# Patient Record
Sex: MALE | Race: WHITE
[De-identification: names, ages, dates, MRNs, and addresses within clinical notes are randomized per-mention and may not be internally consistent; named-entity substitution may affect disease eponyms.]

---

## 2017-01-27 ENCOUNTER — HOSPITAL ENCOUNTER (OUTPATIENT)
Dept: HOSPITAL 45 - C.ULTR | Age: 52
Discharge: HOME | End: 2017-01-27
Attending: INTERNAL MEDICINE
Payer: COMMERCIAL

## 2017-01-27 DIAGNOSIS — M79.1: ICD-10-CM

## 2017-01-27 DIAGNOSIS — E04.1: Primary | ICD-10-CM

## 2017-01-27 LAB — TSH SERPL-ACNC: 1.48 UIU/ML (ref 0.3–4.5)

## 2017-01-27 NOTE — DIAGNOSTIC IMAGING REPORT
ULTRASOUND OF THE THYROID GLAND



CLINICAL HISTORY: Thyroid nodule.



COMPARISON STUDY: Chest CT dated 7/30/2012.



TECHNIQUE: Real-time, grayscale, and color flow sonography of the thyroid gland

is performed utilizing a high-frequency linear transducer. Images are reviewed

in the transverse and longitudinal planes.



FINDINGS:



Right lobe: The right lobe of the thyroid gland is normal in size and

homogeneous in echotexture, measuring 5.0 x 1.9 x 1.8 cm.



Left lobe: The left lobe of the thyroid gland is normal in size and homogeneous

in echotexture, measuring 4.5 x 1.8 x 2.0 cm.



Isthmus: The thyroid isthmus is normal in appearance and measures 0.4 cm in AP

diameter.





IMPRESSION: Unremarkable sonographic assessment of the thyroid gland. No thyroid

nodule is identified.







Electronically signed by:  Martinez Avila M.D.

1/27/2017 11:11 AM



Dictated Date/Time:  1/27/2017 11:09 AM

## 2017-02-21 ENCOUNTER — HOSPITAL ENCOUNTER (OUTPATIENT)
Dept: HOSPITAL 45 - C.NEUR | Age: 52
Discharge: HOME | End: 2017-02-21
Payer: COMMERCIAL

## 2017-02-21 DIAGNOSIS — G47.33: Primary | ICD-10-CM

## 2017-02-22 NOTE — PAP/PSG TECHNICIAN REPORT
[facility]

Technician Polysomnogram Report



Study name:

None

Report date:

2/22/2017



Study date:

2/21/2017

Referring Physician:

 Miles Gonzalez M.D.



Name:

BRYAN FIELD

Interpreting Physician:

ELVIRA Gonzalez M.D.



YOB: 1965

Technician:

KAMERON Costa.



Sex:

Male







Age:

51

StudyType:

PSG



Weight:

272 lbs









Height:

51 years, Height 6' 0"

:







BMI:

36.89







Medications:

Prilosec 20mg, Mirapex, Lyrica 150mg, Spiriva 18mcg inhalation caps, Ventolin HFA 108mcg/act, 
Nicotine gum, Ativan 0.5mg, Lantus 100 unit/ml, Ranitidine HCl 300mg, Ursodiol 300mg

















Patient History





Study started on room air with ETCO2 monitoring in room #6. 51 yr old male here tonight for a 
possible split psg if AHI>10. He has multiple medical problems (liver disease, HTN, diabetes and 
lung problems). He was diagnosed with LAZARO and nocturnal hypoxemia years ago, and has been on cpap 
and oxygen. He is intolerant to cpap and has difficulty with wearing the mask. He is currently not 
using cpap or oxygen. He is having EDS and problems falling asleep.









Parameters Monitored

NPSG: E1-M2, E2-M1, Fp1-M2, Fp2-M1, F3-M2, F4-M2, F4-M1, C3-M2, C4-M2, C4-M1, O1-M2, O2-M2,

O2-M1, T3-M2, T4-M1, P3-M2, P4-M1, CHIN1, CHIN2, HR, EKG, Legs, PFLOW, SNOR, FLOW, CFLOW,

Tidal Volume, THOR, ABDO, SpO2, PLTH, CPRESS, ETCO2 Wave, ETCO2, pH





Sleep Architecture



Sleep Stages



Time at Lights Off

9:50:43 PM



STAGES

Time (min.)

TST (%)



Time at Lights On

12:31:43 AM



Wake

155.5

--



Total Recording Time (TRT)

161.00 min.



N1

5.5

100



Total Sleep Period (TSP)

6.0 min.



N2

0.0

0



Total Sleep Time (TST)

5.5min.



N3

0.0

0



Awake Time

155.5 min.



REM

0.0

0



Wake after Sleep Onset

75.5 min.











Sleep Efficiency (SE)

3 %











Sleep Onset Latency (SOL)

80.0 min.











Number of Stage 1 Shifts

None











Awakenings

2











Stage Changes

4











Number of REM periods

N/A



REM

0.0

0



REM Latency

NONE min.



NREM

5.5

100





Body Position Analysis





Supine

Right

Left

Side

Prone

Vertical



Total Sleep Time (min.)

14.2

5.5

0.0

5.50

0.0

0.0



Total Sleep Time (%)

0%

100%

0%

100

0%

N/A%



Total Sleep Time REM (min.)

0.0

None

0.0

0.0



Total Sleep Time NREM (min.)

0.0

5.5

0.0

None

0.0

0.0



Intermittent Wake (min.)

14.2

87.4

53.9

None

0.0

0.0



Total Sleep Period (%)

0%

None





Arousals







Myoclonus (PLM) *







Events

Count

Index



Events

Count

Index



Spontaneous

2

22



Events Awake (PLMW)

161

62.1



Respiratory

0

0.0



Events Asleep w/ Arousal (PLMA)

9

98.2



PLM

9

98



Events Asleep w/o Arousal (PLMS)

3

32.7



Snoring

0

0



Total Asleep

12

130.9



Total

11

120



Total

173

64







Respiratory Analysis *





CA

OA

MA

CH

H

RERA

Total



Count

0



Index

0.0

0

0.0

0

0.0



Mean Duration

0.0

0.00

0.0



Longest Duration

0.0

0.00

0.0







Respiratory Event Summary 







Total

Supine

~Supine

Right

Left

Prone

REM

NREM



Apneas

Count

0

N/A

0

0

N/A

0





Index

0.0

N/A

0

0.0

N/A

0



Hypopneas (4% Desat)

Count

0

N/A

0

0

N/A

0





Index

0.0

N/A

0

0.0

N/A

0.0



Apneas & All Hypopneas 

Count

0

N/A

0

0

N/A

0





Index

0.0

N/A

0

0

N/A

0.0



Respiratory Events 

(Apn+All Hyp+RERA)

Count

0

N/A

0

0

N/A

0





Index

0.0

N/A

0

0.0

N/A

0.0



Respiratory Related Arousal

Count

0

N/A

0

0

N/A

0





Index

0.0

N/A

0

0

N/A

0





Snoring Analysis





Supine

Right

Left

Prone

REM

NREM

Total



Snore duration

0.6 min



Snores count

N/A

13

N/A

13

13



Snore mean duration

2.7 Sec



Snores index

N/A

142

N/A

141.8

141.8



TST with snoring (%)

10.7%









SpO2 Analysis



Total

REM

NREM

Awake



<50%

0.0 min.



51 - 60%

0.0 min.



61 - 70%

0.0 min.



71 - 80%

0.0 min.



81 - 90%

0.4 min.

0.0 min.

0.0 min.

0.4 min.



91 - 100%

134.8 min.

0.0 min.

5.5 min.

129.3 min.



Average

95

0

96

95



Minimum SpO2

82

N/A

94

82



Desaturation Event Index

5.6

0.0

0.0

5.8



# Desat. Events below 89%

N/A



Time(%) with Saturation below 89%

0.1

0.0

0.0

0.1



Time(min.) with Saturation below 89%

0.2

0.0

0.0

0.2







Heart Rate Analysis



End Tidal CO2 Analysis





Min (bpm)

Max (bpm)

Average (bpm)





TSP (mins)

% of TSP



Awake

64

214

80



Above 55 mmHg

0.0

0.0



NREM

71

92

80



50-55 mmHg

0.0

0.0



REM

N/A



45-50 mmHg

0.0

0.0



Overall

71

92

80



40-45 mmHg

0.0

0.0













35-40 mmHg

0.9

16.6













30-35 mmHg

4.5

81.0































Average ETCO2

0.0





Supplemental O2 Values



Minimum O2 level: None



Value  

Start Time

End Time





Technician Comments















 



 



 



 



 



 







Mr. Field laid in the right, left and supine positions.  No cardiac arrhythmia noted. His legs 
seemed to move the entire time he was in bed.   No bruxism noted.  Snoring was not noted and scored 
as a 0 on a scale of 1 through 5. (0=no snoring, 5=snoring loud enough to be heard through a closed 
door or down the [address] way) He used the restroom once during the night.  His total sleep time was 
only 5and1/2 minutes. He became frustrated and ended the study early and left the sleep lab at 
12:25am. 

The final report will be interpreted and signed by a sleep physician. The completed physician report 
will then be placed in the patient medical record 



Therapy (cm H2O)

0



TIB (min.)

161.0



TST (min.)

5.5



Sleep Onset (min.)

80.0



REM Onset From Sleep (min.)

NONE



Sleep Efficiency %

3



Wakefulness (%)

97



Wakefulness (min.)

155.5



NREM 1 (%)

100



NREM 1 (min.)

5.5



NREM 2 (%)

0



NREM 2 (min.)

0.0



NREM 3 (%)

0



NREM 3 (min.)

0.0







REM (%)

0



REM (min.)

0.0



# Arousals

11



Arousal Index

120



# Snore

13



Snore Index

141.8



AHI

0.0



AHI Supine

N/A



AHI Non-Supine

0



NREM AHI

0.0



REM AHI

N/A



RDI

0.0



# Obstructive Apnea

0



# Central Apnea

0



# Mixed Apnea

0







# Hypopneas

0



RERAs

0



Total Respiratory Events

0



Time Below SpO2 89% (min.)

0.0



Mean NREM SpO2 (%)

96



Mean REM SpO2 (%)

N/A



Mean Sleep SpO2 (%)

96



Min NREM SpO2 (%)

94



Min REM SpO2 (%)

N/A



Position Supine (min.)

14.2



Position Non-supine (min.)

5.5



LM Index Sleep

130.9



LM Index NREM

130.9



LM Index REM

N/A







Mean Heart Rate (bpm)

80



Min Heart Rate (bpm)

71

## 2017-02-27 NOTE — POLYSOMNOGRAPH REPORT
REFERRING PERSON:  Dr. Meg Gonzalez.

 

TECHNICIAN:  Cathleen Cortez.

 

Mr. Field is a 51-year-old male with multiple medical problems including

liver disease, hypertension, diabetes who was diagnosed with obstructive

sleep apnea with nocturnal hypoxemia, years ago.  He came intolerant to CPAP

and was discontinued.  He is having excessive daytime sleepiness.  He returns

to the sleep lab to see if he can qualify for equipment, once again.

 

Following the technical and digital specifications of the American Academy of

Sleep Medicine (AASM) a standard diagnostic polysomnogram was performed

monitoring EEG, EOG, EMG (chin and leg deviations), oxygen saturation, body

position, digital video, respiratory effort and airflow.  The sleep Stage and

event scoring was based on the AASM Manual for the Scoring of Sleep and

Associated Events 2007 edition.  Apneas are defined as a drop in the peak

thermal sensor excursion by >90% of baseline for at least 10 seconds. 

Hypopneas were scored using the 4% oxygen desaturation rule (4A-[employer]) and

a decrease in the nasal pressure excursions by >30% of baseline for at least

10 seconds.  Respiratory effort-related arousal (RERA's) is defined as a

sequence of breaths lasting at least 10 seconds characterized by increasing

respiratory effort or flattening of the nasal pressure waveform leading to an

arousal from sleep when the sequence of breaths does not meet criteria for an

apnea or hypopnea.  Apnea Hypopnea index (AHI) is defined as the number of

apneas and hypopneas occurring in an hour of sleep.  Respiratory disturbance

index (RDI) is defined as the number of apneas, hypopneas, and RERA's

occurring in an hour of sleep.

 

 

Mr. Field struggled on this polysomnogram.  He was observed from 9:50

p.m. to 12:31 a.m.  Total recording time was 161 minutes.  Total sleep time

was only 5.5 minutes.  Only 5.5 minutes of N1 sleep was recorded on this

study.  There were no respiratory events during that time.  There were 12

periodic limb movements during that time.  Thirteen snoring events were

recorded.  Mean saturation was 95%.  This patient became frustrated and

wanted to discontinue therapy and this patient requested this study end at

2:25 a.m.  He left the sleep lab shortly thereafter.

 

IMPRESSION AND PLAN:  Nondiagnostic polysomnogram.  Only 5.5 minutes of total

sleep time was recorded during 161 minutes of observation.  This test will

need to be repeated.

## 2017-03-01 ENCOUNTER — HOSPITAL ENCOUNTER (OUTPATIENT)
Dept: HOSPITAL 45 - C.GI | Age: 52
Discharge: HOME | End: 2017-03-01
Attending: INTERNAL MEDICINE
Payer: COMMERCIAL

## 2017-03-01 VITALS — SYSTOLIC BLOOD PRESSURE: 131 MMHG | HEART RATE: 77 BPM | OXYGEN SATURATION: 97 % | DIASTOLIC BLOOD PRESSURE: 79 MMHG

## 2017-03-01 VITALS
HEIGHT: 72.99 IN | WEIGHT: 275.58 LBS | BODY MASS INDEX: 36.52 KG/M2 | WEIGHT: 275.58 LBS | BODY MASS INDEX: 36.52 KG/M2 | HEIGHT: 72.99 IN

## 2017-03-01 VITALS — TEMPERATURE: 98.6 F

## 2017-03-01 DIAGNOSIS — Z88.5: ICD-10-CM

## 2017-03-01 DIAGNOSIS — J44.9: ICD-10-CM

## 2017-03-01 DIAGNOSIS — E11.9: ICD-10-CM

## 2017-03-01 DIAGNOSIS — R93.3: Primary | ICD-10-CM

## 2017-03-01 DIAGNOSIS — K57.30: ICD-10-CM

## 2017-03-01 DIAGNOSIS — Z88.0: ICD-10-CM

## 2017-03-01 DIAGNOSIS — K64.8: ICD-10-CM

## 2017-03-01 NOTE — ANESTHESIOLOGY PROGRESS NOTE
Anesthesia Post Op Note


Date & Time


Mar 1, 2017 at 15:31





Vital Signs


Pain Intensity:  0





 Vital Signs Past 12 Hours








  Date Time  Temp Pulse Resp B/P Pulse Ox O2 Delivery O2 Flow Rate FiO2


 


3/1/17 11:46  77 20 131/79 97 Room Air  


 


3/1/17 11:31  84 20 114/70 94 Room Air  


 


3/1/17 11:16  87 20 108/63 94 Room Air  


 


3/1/17 10:07 37.0 76 20 124/74 98 Room Air  











Notes


Mental Status:  alert / awake / arousable


Nausea / Vomiting:  adequately controlled


Pain:  adequately controlled


Airway Patency, RR, SpO2:  stable & adequate


BP & HR:  stable & adequate


Hydration State:  stable & adequate


Anesthetic Complications:  no major complications apparent

## 2017-03-01 NOTE — DISCHARGE INSTRUCTIONS
Endoscopy Patient Instructions


Date / Procedure(s) Performed


Mar 1, 2017.


Colonoscopy





Allergy Information


Coded Allergies:  


     Penicillins (Verified  Allergy, Severe, ANAPHYLAXIS, 3/1/17)


 reports an iv dose of penicillin in ER when he was 22 yrs


 old.  Had to stay in hospital for 2 days.


     Morphine (Verified  Allergy, Intermediate, Severe itching/mouth rash, 3/1/

17)


 * PT MUST BE PRETREATED WITH BENADRYL


     Adhesives (Verified  Allergy, Unknown, rips skin, 3/1/17)


     Meperidine (Verified  Allergy, Unknown, pruritis/rash, 3/1/17)


 is able to tolerate IF PRETREATED WITH BENADRYL





Discharge Date / Findings


Mar 1, 2017.


Hemorrhoids


Diverticulosis of the colon





Medication Instructions


Stopped Medication(s):  


INSULIN  STOP MORNING DOSE


Restart Stopped Medication(s):





 Reported Home Medications








 Medications  Dose


 Route/Sig


 Max Daily Dose Days Date Category


 


 Novolog Flexpen


  (Insulin Aspart)


 100 Units/Ml Inj  15


 QD


    3/1/17 Reported


 


 Lantus (Insulin


 Glargine) 100


 Unit/Ml Inj  0


 SC SUPPER


    3/1/17 Reported


 


 Lantus (Insulin


 Glargine) 100


 Unit/Ml Inj  0


 SC BEFORE LUNCH


    3/1/17 Reported


 


 Lantus (Insulin


 Glargine) 100


 Unit/Ml Inj  0


 SC AM PRN


    3/1/17 Reported


 


 Ondansetron HCl


  (Ondansetron) 8


 Mg Tab  8 Mg


 PO Q8 PRN


    12/24/16 Reported


 


 Zantac


  (Ranitidine HCl)


 150 Mg Tab  300 Mg


 PO QPM


    9/30/16 Reported


 


 Prilosec


  (Omeprazole) 20


 Mg Cap  20 Mg


 PO QPM PRN


    9/30/16 Reported


 


 Ventolin


  (Albuterol)  Inh  2 Puffs


 INH QID PRN


   5 3/31/16 Reported


 


 Prilosec


  (Omeprazole) 20


 Mg Cap  40 Mg


 PO QPM


    10/16/15 Reported











Provider Instructions





Activity Restrictions





-  No exercising or heavy lifting for 24 hours. 


-  Do not drink alcohol the day of the procedure.


-  Do not drive a car or operate machinery until the day after the procedure.


-  Do not make any important decisions or sign important papers in 24 hours 

after the procedure.





Following Day:





-  Return to full activity which may include returning to work/school.





Diet





Start your diet with liquids and light foods (jello, soup, juice, toast).  Then 

eat your usual diet if not nauseated.





Treatment For Common After Affects





For mild abdominal pain, bloating, or excessive gas:





-  Rest


-  Eat lightly


-  Lie on right side





Follow-Up Information


Repeat colonoscopy in 3 years due to a suboptimal bowel preparation





Anesthesia Information





What You Should Know





You have had a procedure that required some medicine to reduce anxiety and 

discomfort. This treatment is called moderate sedation.  


After receiving the treatment, you may be sleepy, but you will be able to 

breathe on your own.  The effects of the treatment may last for several hours.








Follow these instructions along with Activity/Diet recommendations noted above:





*  Do NOT do anything where dizziness or clumsiness would be dangerous.





*  Rest quietly at home today, then you can be up and about tomorrow.





*  Have a responsible person stay with you the rest of today.





*  You may have had an I.V. today.  If so, you may take the dressing off later 

today.





Recommendations


 


Call your doctor if:





*  Trouble breathing 





*  Continuous vomiting for more than 24 hours





*  Temperature above 101 degrees





*  Severe abdominal pain or bloating





*  Pain not relieved by pain medicine ordered





*  There is increased drainage or redness from any incision





*  A large amount of rectal bleeding greater than 2-3 tablespoons. 


   (If you had a polyp/s removed or have hemorrhoids, a small amount of blood -


    from the rectum is to be expected.)





*  You have any unanswered questions or concerns.





IN THE EVENT OF A SERIOUS EMERGENCY, GO TO THE NEAREST EMERGENCY ROOM





       Your discharge instructions were prepared by provider Wiley Robb.


 Patient Instructions Signature Page








Blake Field 











Patient (or Guardian) Signature/Date:____________________________________ I 

have read and understand the instructions given to me by my caregivers.








Caregiver/RN/Doctor Signature/Date:____________________________________








The above-named patient and/or guardian has received patient instructions on 

this date.


























+  Original Patient Signature Page (only) stays with chart.  Please make copy 

for patient.

## 2017-03-01 NOTE — ENDO HISTORY AND PHYSICAL
History & Physical


Date of Service:


Mar 1, 2017.


Chief Complaint:


Abnormal CT


Referring Physician:


Dr. Epstein


History of Present Illness


Patient referred for a colonosocpy to screening for colon cancer after being 

found to have an abnormal CT of the abdomen.





Past Medical History


Diabetes, Neurological Disorder, Arthritis, Fractures, Asthma, Gastrointestinal 

Disorder, Anxiety, Reflux, Sleep Apnea, COPD, Liver Disease, Other, Depression





Past Surgical History


Hx Cardiac Surgery:  No


Hx Internal Defibrillator:  No


Hx Pacemaker:  No


Hx Abdominal Surgery:  Yes (UMBILICAL HERNIA REPAIR, AVI)


Hx Post-Op Nausea and Vomiting:  No


Hx Cancer Surgery:  No


Hx Thoracic Surgery:  No


Hx Orthopedic:  Yes (CERVICAL NECK FUSION X 2,  LEFT SHOULDER REPAIR, LEFT 

WRIST REPAIR)


Hx Urinary Tract Surgery:  No





Social History


Smoking Status:  Current Every Day Smoker


Hx Substance Use:  No


Hx Alcohol Use:  No





Allergies


Coded Allergies:  


     Penicillins (Verified  Allergy, Severe, ANAPHYLAXIS, 3/1/17)


 reports an iv dose of penicillin in ER when he was 22 yrs


 old.  Had to stay in hospital for 2 days.


     Morphine (Verified  Allergy, Intermediate, Severe itching/mouth rash, 3/1/

17)


 * PT MUST BE PRETREATED WITH BENADRYL


     Adhesives (Verified  Allergy, Unknown, rips skin, 3/1/17)


     Meperidine (Verified  Allergy, Unknown, pruritis/rash, 3/1/17)


 is able to tolerate IF PRETREATED WITH BENADRYL





Current Medications





 Reported Home Medications








 Medications  Dose


 Route/Sig


 Max Daily Dose Days Date Category


 


 Novolog Flexpen


  (Insulin Aspart)


 100 Units/Ml Inj  15


 QD


    3/1/17 Reported


 


 Lantus (Insulin


 Glargine) 100


 Unit/Ml Inj  0


 SC SUPPER


    3/1/17 Reported


 


 Lantus (Insulin


 Glargine) 100


 Unit/Ml Inj  0


 SC BEFORE LUNCH


    3/1/17 Reported


 


 Lantus (Insulin


 Glargine) 100


 Unit/Ml Inj  0


 SC AM PRN


    3/1/17 Reported


 


 Ondansetron HCl


  (Ondansetron) 8


 Mg Tab  8 Mg


 PO Q8 PRN


    12/24/16 Reported


 


 Zantac


  (Ranitidine HCl)


 150 Mg Tab  300 Mg


 PO QPM


    9/30/16 Reported


 


 Prilosec


  (Omeprazole) 20


 Mg Cap  20 Mg


 PO QPM PRN


    9/30/16 Reported


 


 Ventolin


  (Albuterol)  Inh  2 Puffs


 INH QID PRN


   5 3/31/16 Reported


 


 Prilosec


  (Omeprazole) 20


 Mg Cap  40 Mg


 PO QPM


    10/16/15 Reported











Vital Signs


Weight (Kilograms):  125


Height (Feet):  6


Height (Inches):  1





Physical Exam


General Appearance:  no apparent distress


Respiratory/Chest:  


   Auscultation:  deminished air movement


Cardiovascular:  


   Heart Auscultation:  RRR


Abdomen:  


   Inspection & Palpation:  soft, distended





Assessment and Plan


patient for colonsocopy today due to an abnormal CT of the colon.  We have 

discussed the risks to include bleeding, infection, perforation, pain, and 

missed polyps.

## 2017-03-01 NOTE — GI REPORT
Procedure Date: 3/1/2017 10:57 AM

Procedure:            Colonoscopy

Indications:          Abnormal CT of the GI tract

Medicines:            Monitored Anesthesia Care

Complications:        No immediate complications. Estimated blood loss: 

                      Minimal.

Estimated Blood Loss: Estimated blood loss was minimal.

Procedure:            Pre-Anesthesia Assessment:

                      - Prior to the procedure, a History and Physical was 

                      performed, and patient medications, allergies and 

                      sensitivities were reviewed. The patient's tolerance of 

                      previous anesthesia was reviewed.

                      - The risks and benefits of the procedure and the 

                      sedation options and risks were discussed with the 

                      patient. All questions were answered and informed 

                      consent was obtained.

                      - Patient identification and proposed procedure were 

                      verified prior to the procedure by the physician, the 

                      nurse and the anesthetist. The procedure was verified 

                      in the procedure room.

                      - Pre-procedure physical examination revealed no 

                      contraindications to sedation.

                      - ASA Grade Assessment: III - A patient with severe 

                      systemic disease.

                      - After reviewing the risks and benefits, the patient 

                      was deemed in satisfactory condition to undergo the 

                      procedure.

                      - The anesthesia plan was to use monitored anesthesia 

                      care (MAC).

                      - Immediately prior to administration of medications, 

                      the patient was re-assessed for adequacy to receive 

                      sedatives.

                      - The heart rate, respiratory rate, oxygen saturations, 

                      blood pressure, adequacy of pulmonary ventilation, and 

                      response to care were monitored throughout the 

                      procedure.

                      - The physical status of the patient was re-assessed 

                      after the procedure.

                      After I obtained informed consent, the scope was passed 

                      under direct vision. Throughout the procedure, the 

                      patient's blood pressure, pulse, and oxygen saturations 

                      were monitored continuously. The scope was introduced 

                      through the anus and advanced to the terminal ileum. 

                      The colonoscopy was performed without difficulty. The 

                      patient tolerated the procedure well. The quality of 

                      the bowel preparation was adequate to identify polyps 6 

                      mm and larger in size.

Findings:

     The perianal and digital rectal examinations were normal. Pertinent 

     negatives include normal sphincter tone.

     The terminal ileum appeared normal.

     A few small-mouthed diverticula were found in the sigmoid colon.

     Internal hemorrhoids were found during retroflexion. The hemorrhoids 

     were mild.

     The exam was otherwise without abnormality.

Impression:           - The examined portion of the ileum was normal.

                      - Mild diverticulosis in the sigmoid colon.

                      - Internal hemorrhoids.

                      - The examination was otherwise normal.

                      - No specimens collected.

Recommendation:       - Discharge patient to home (ambulatory).

                      - Advance diet as tolerated today.

                      - Repeat colonoscopy in 3 years because the bowel 

                      preparation was suboptimal.

Marten Robb, D.MIKEY Robb DO

3/1/2017 11:15:31 AM

This report has been signed electronically.

Note Initiated On: 3/1/2017 10:57 AM

     I attest to the content of the Intraoperative Record and orders 

     documented therein, exceptions below

## 2017-04-14 ENCOUNTER — HOSPITAL ENCOUNTER (EMERGENCY)
Dept: HOSPITAL 45 - C.EDB | Age: 52
Discharge: HOME | End: 2017-04-14
Payer: COMMERCIAL

## 2017-04-14 VITALS
BODY MASS INDEX: 35.83 KG/M2 | WEIGHT: 264.55 LBS | HEIGHT: 72.01 IN | BODY MASS INDEX: 35.83 KG/M2 | HEIGHT: 72.01 IN | WEIGHT: 264.55 LBS

## 2017-04-14 VITALS — OXYGEN SATURATION: 98 % | DIASTOLIC BLOOD PRESSURE: 84 MMHG | HEART RATE: 92 BPM | SYSTOLIC BLOOD PRESSURE: 138 MMHG

## 2017-04-14 VITALS — TEMPERATURE: 97.52 F

## 2017-04-14 DIAGNOSIS — E78.5: ICD-10-CM

## 2017-04-14 DIAGNOSIS — Z83.3: ICD-10-CM

## 2017-04-14 DIAGNOSIS — M50.90: ICD-10-CM

## 2017-04-14 DIAGNOSIS — K76.0: ICD-10-CM

## 2017-04-14 DIAGNOSIS — E11.9: ICD-10-CM

## 2017-04-14 DIAGNOSIS — D86.9: ICD-10-CM

## 2017-04-14 DIAGNOSIS — J44.9: ICD-10-CM

## 2017-04-14 DIAGNOSIS — K21.9: ICD-10-CM

## 2017-04-14 DIAGNOSIS — F17.210: ICD-10-CM
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
## 2017-04-14 DIAGNOSIS — Z79.899: ICD-10-CM

## 2017-04-14 DIAGNOSIS — S80.11XA: Primary | ICD-10-CM

## 2017-04-14 DIAGNOSIS — Z82.49: ICD-10-CM

## 2017-04-14 DIAGNOSIS — Z79.4: ICD-10-CM

## 2017-04-14 DIAGNOSIS — G25.81: ICD-10-CM

## 2017-04-14 NOTE — DIAGNOSTIC IMAGING REPORT
RIGHT FEMUR 2 VIEWS ROUTINE



CLINICAL HISTORY: Right thigh trauma.    



COMPARISON: CT of the abdomen and pelvis October 16, 2015.



FINDINGS:  No acute fracture of the right femur is identified. There is no right

knee joint effusion. There is spurring of the patella at the insertion of the

quadriceps. There is mild arthritis of the right hip.



IMPRESSION: No acute fracture of the right femur.







Electronically signed by:  El Salazar M.D.

4/14/2017 12:30 PM



Dictated Date/Time:  4/14/2017 12:28 PM

## 2017-04-14 NOTE — EMERGENCY ROOM VISIT NOTE
History


Report prepared by Janae:  Tristen Valdez


Under the Supervision of:  Dr. Hussain Gutierrez M.D.


First contact with patient:  11:19


Chief Complaint:  LEG PAIN,LEG INJURY


Stated Complaint:  RIGHT THIGH- PLEASE CHECK FOR COMPRESSION FX





History of Present Illness


The patient is a 51 year old male who presents to the Emergency Room with 

complaints of worsening right leg pain for the past two days. The patient 

states that he was putting in a fence post, and the post was let go by someone 

helping him, and it hit the patient in the right thigh. The patient states that 

it hit him, and he was flung into a tree three feet away, though he did not 

fall down. The patient states that the pain is exacerbated with walking and 

bending his knee. He states that he did not put ice on it afterwards. Pt denies 

LOC, headache, visual changes, neck pain, chest pain, breathing difficulties, 

nausea, vomiting, abdominal pain, back pain, numbness, weakness, open wounds, 

active bleeding, or other complaints.





   Source of History:  patient


   Onset:  two days ago


   Position:  leg (right)


   Timing:  worsening


   Modifying Factors (Worsening):  other (walking and bending the knee)





Review of Systems


See HPI for pertinent positives & negatives. A total of 6 systems reviewed and 

were otherwise negative.





Past Medical & Surgical


Medical Problems:


(1) Cervical disc disease


(2) COPD (chronic obstructive pulmonary disease)


(3) Diabetes mellitus, type II


(4) Dyslipidemia


(5) Fatty liver


(6) GERD (gastroesophageal reflux disease)


(7) RLS (restless legs syndrome)


(8) Sarcoidosis


Surgical Problems:


(1) Status post cervical spinal fusion


(2) Status post cholecystectomy


(3) Status post endoscopic US with Bx


(4) Status post liver biopsy


(5) Status post repair nasal septum


(6) Status post thoracic spine fusion








Family History





FH: diabetes mellitus


  MOTHER


FH: heart disease


  MOTHER (? endocarditis)





Social History


Smoking Status:  Current Every Day Smoker


Alcohol Use:  occasionally


Marital Status:  


Housing Status:  lives with family


Occupation Status:  disabled





Current/Historical Medications


Scheduled


Insulin Aspart (Novolog Flexpen), 15 UNITS SC QAM


Insulin Aspart (Novolog Flexpen), 18 UNITS SC DAILY


Insulin Aspart (Novolog Flexpen), 22 UNITS SC QPM


Insulin Glargine (Lantus), 55 UNITS SC QAM


Omeprazole (Prilosec), 20 MG PO TID


Ranitidine (Zantac), 150 MG PO BID





Allergies


Coded Allergies:  


     Penicillins (Verified  Allergy, Severe, ANAPHYLAXIS, 3/1/17)


 reports an iv dose of penicillin in ER when he was 22 yrs


 old.  Had to stay in hospital for 2 days.


     Morphine (Verified  Allergy, Intermediate, Severe itching/mouth rash, 3/1/

17)


 * PT MUST BE PRETREATED WITH BENADRYL


     Adhesives (Verified  Allergy, Unknown, rips skin, 3/1/17)


     Meperidine (Verified  Allergy, Unknown, pruritis/rash, 3/1/17)


 is able to tolerate IF PRETREATED WITH BENADRYL





Physical Exam


Vital Signs











  Date Time  Temp Pulse Resp B/P Pulse Ox O2 Delivery O2 Flow Rate FiO2


 


4/14/17 13:14  92 20 138/84 98   


 


4/14/17 12:18  98 14 121/78 98 Room Air  


 


4/14/17 10:58 36.4 91 16 136/84 97 Room Air  











Physical Exam


GENERAL: Awake, alert, well-appearing, in no distress


HENT: Normocephalic, atraumatic. Oropharynx unremarkable.


EYES: Normal conjunctiva. Sclera non-icteric.


NECK: Supple. No nuchal rigidity. FROM. No JVD.


RESPIRATORY: Clear to auscultation.


CARDIAC: Regular rate, normal rhythm. Extremities warm and well perfused. 

Pulses equal.


ABDOMEN: Soft, non-distended. No tenderness to palpation. No rebound or 

guarding. No masses.


MUSCULOSKELETAL: Chest examination reveals no tenderness. No joint edema. 


LOWER EXTREMITIES: Left thigh is soft. Bruise on the anterior aspect. Calves 

are equal size bilaterally and non-tender. No edema. 


NEURO: Normal sensorium. No sensory or motor deficits noted. 


SKIN: No rash or jaundice noted.





Medical Decision & Procedures


ER Provider


Diagnostic Interpretation:


X-ray: Per my interpretation, radiologist review. 





RIGHT FEMUR 2 VIEWS ROUTINE





CLINICAL HISTORY: Right thigh trauma.    





COMPARISON: CT of the abdomen and pelvis October 16, 2015.





FINDINGS:  No acute fracture of the right femur is identified. There is no right


knee joint effusion. There is spurring of the patella at the insertion of the


quadriceps. There is mild arthritis of the right hip.





IMPRESSION: No acute fracture of the right femur.





Electronically signed by:  El Salazar M.D.


4/14/2017 12:30 PM





Dictated Date/Time:  4/14/2017 12:28 PM





ED Course


1119: The patient was evaluated in room C7. A complete history and physical 

exam was performed.





1245: I reevaluated the patient. Discussed results and discharge instructions: 

He verbalized understanding and agreement. The patient is ready for discharge.





Medical Decision


Triage Nursing notes reviewed.


The patient's presentation and history were concerning for leg pain.  





Etiologies such as soft tissue injury, fracture, dislocation, neurovascular 

compromise, compartment syndrome, as well as others were entertained.  








Patient is doing very well.  He had suffered a direct hit to the right thigh 2 

days ago.  He is having pain but he can ambulate.  He is using a cane for 

stability.  The patient underwent x-ray imaging as he was concerned about a 

fracture.  There is nothing present on x-ray.  His leg is soft.  There is a 

small area of ecchymosis.  No significant hematoma.  Nothing to suggest 

compartment syndrome.  No neurologic compromise.  It appears that he has just 

suffered a significant contusion.  Additional testing was deemed necessary.  I 

discussed conservative management with the patient and he was in agreement and 

felt very comfortable.





By the evaluation outlined above other emergent etiologies such as those listed 

in the differential, as well as others, were deemed relatively unlikely.  





The patient was informed about the findings as listed above.  All questions 

were answered and he was pleased with the treatment.  Return instructions were 

outlined and the patient was discharged in stable condition.  





The patient was referred to his PCP or orthopedic for follow-up next week for a 

recheck of the current condition.





The chart was completed utilizing Dragon Speech voice recognition software.   

Grammatical errors, random word insertions, pronoun errors, and incomplete 

sentences are an occasional consequence of this system due to software 

limitations, ambient noise, and hardware issues.  Any formal questions or 

concerns about the content, text, or information contained within the body of 

this dictation should be directly addressed to the physician for clarification.





[place] Drug Monitoring Program


Search Results:  patient reviewed within database, see additional documentation


Drug Monitoring Findings:


The patient has multiple prescriptions done last year, however nothing recently.





Impression





 Primary Impression:  


 Contusion of right leg





Scribe Attestation


The scribe's documentation has been prepared under my direction and personally 

reviewed by me in its entirety. I confirm that the note above accurately 

reflects all work, treatment, procedures, and medical decision making performed 

by me.





Departure Information


Dispostion


Home / Self-Care





Referrals


Renee Herrera D.O. (PCP)





Forms


HOME CARE DOCUMENTATION FORM,                                                 

               IMPORTANT VISIT INFORMATION





Patient Instructions


My [facility]





Additional Instructions





Ibuprofen(Motrin, Advil) may be used for fever or pain.  Use 600mg every six 

hours as needed.  Take with food.  Avoid using more than 2400mg in a 24 hour 

period.  Do not use 2400mg per day for more than three consecutive days without 

physician direction.  Prolonged inappropriate use can lead to stomach upset or 

ulcers.  


(AND/OR)


Acetaminophen(Tylenol) may be used for fever or pain.  Use 1000mg every six 

hours as needed.  Avoid using more than 4000mg in a 24 hour period.  





Warm compresses for 20 minutes at a time four times daily for 2-3 days.





Use the cane as instructed. 








Rest and elevate your injury.





Return to the ER immediately for any numbness, tingling, severe pain, extreme 

swelling in the extremity or as needed.





Call [facility], [phone], next week to arrange follow up for 

your injury if symptoms persist.





Follow-up with your primary care physician in 3 days for a recheck of your 

current condition.

## 2017-08-02 ENCOUNTER — HOSPITAL ENCOUNTER (OUTPATIENT)
Dept: HOSPITAL 45 - C.GI | Age: 52
Discharge: HOME | End: 2017-08-02
Attending: INTERNAL MEDICINE
Payer: COMMERCIAL

## 2017-08-02 VITALS — SYSTOLIC BLOOD PRESSURE: 123 MMHG | DIASTOLIC BLOOD PRESSURE: 73 MMHG | OXYGEN SATURATION: 98 % | HEART RATE: 76 BPM

## 2017-08-02 VITALS — TEMPERATURE: 97.7 F

## 2017-08-02 VITALS
WEIGHT: 275.58 LBS | BODY MASS INDEX: 36.52 KG/M2 | HEIGHT: 72.99 IN | BODY MASS INDEX: 36.52 KG/M2 | HEIGHT: 72.99 IN | WEIGHT: 275.58 LBS

## 2017-08-02 DIAGNOSIS — E11.9: ICD-10-CM

## 2017-08-02 DIAGNOSIS — I85.00: ICD-10-CM

## 2017-08-02 DIAGNOSIS — E66.9: ICD-10-CM

## 2017-08-02 DIAGNOSIS — K21.9: ICD-10-CM

## 2017-08-02 DIAGNOSIS — Z90.49: ICD-10-CM

## 2017-08-02 DIAGNOSIS — K74.60: Primary | ICD-10-CM

## 2017-08-02 DIAGNOSIS — F17.200: ICD-10-CM

## 2017-08-02 DIAGNOSIS — J45.909: ICD-10-CM

## 2017-08-02 DIAGNOSIS — M19.90: ICD-10-CM

## 2017-08-02 DIAGNOSIS — G47.33: ICD-10-CM

## 2017-08-02 DIAGNOSIS — R16.1: ICD-10-CM

## 2017-08-02 DIAGNOSIS — K31.89: ICD-10-CM

## 2017-08-02 DIAGNOSIS — Z79.84: ICD-10-CM

## 2017-08-02 NOTE — ENDO HISTORY AND PHYSICAL
History & Physical


Date of Service:


Aug 2, 2017.


Chief Complaint:


Cirrhosis of liver


Referring Physician:


Dr. Renee Herrera


History of Present Illness


Patient with a history of portal hypertension presenting for screening upper 

endoscopy today.  He does have a history of grade 1 to grade 2 esophageal 

varices and is due for surveillance.





Past Medical History


Diabetes, Neurological Disorder, Arthritis, Fractures, Asthma, Gastrointestinal 

Disorder, Anxiety, Reflux, Sleep Apnea, COPD, Liver Disease, Other, Depression





Past Surgical History


Hx Cardiac Surgery:  Yes (HEART CATH, NO STENTS)


Hx Internal Defibrillator:  No


Hx Pacemaker:  No


Hx Abdominal Surgery:  Yes (HERNIA REPAIR, AVI)


Hx of Implantable Prosthesis:  No


Hx Post-Op Nausea and Vomiting:  No


Hx Cancer Surgery:  No


Hx Thoracic Surgery:  No


Hx Orthopedic:  Yes (THORACIC BACK SURGERY, CERVICAL FUSIONS X3 (LIMITED LT,RT,

UP))


Hx Urinary Tract Surgery:  No





Family History


None





Social History


Smoking Status:  Current Every Day Smoker


Hx Substance Use:  No


Hx Alcohol Use:  No





Allergies


Coded Allergies:  


     Penicillins (Verified  Allergy, Severe, ANAPHYLAXIS, 7/31/17)


 reports an iv dose of penicillin in ER when he was 22 yrs


 old.  Had to stay in hospital for 2 days.


     Morphine (Verified  Allergy, Intermediate, Severe itching/mouth rash, 7/31/ 17)


 * PT MUST BE PRETREATED WITH BENADRYL


     Adhesives (Verified  Allergy, Unknown, rips skin, 7/31/17)


     Meperidine (Verified  Allergy, Unknown, pruritis/rash, 7/31/17)


 is able to tolerate IF PRETREATED WITH BENADRYL





Current Medications





Reported Home Medications








 Medications  Dose


 Route/Sig


 Max Daily Dose Days Date Category Dose


Instructions


 


 Duoneb


  (Ipratropium-Albuterol)


 3 Ml Nebu  1 Treatment


 INH Q4H PRN


    7/31/17 Reported 


 


 Ventolin Hfa


  (Albuterol) 200


 Puffs/11455 Mcg


 Aers  2-4 Puffs


 INH Q6H PRN


    7/31/17 Reported 


 


 Zantac


  (Ranitidine HCl)


 150 Mg Tab  150 Mg


 PO BID


    4/14/17 Reported 


 


 Prilosec


  (Omeprazole) 20


 Mg Capcr  20 Mg


 PO TID


    4/14/17 Reported 


 


 Novolog Flexpen


  (Insulin Aspart)


 100 Units/Ml Inj  27 Units


 SC QPM


    4/14/17 Reported 


 


 Novolog Flexpen


  (Insulin Aspart)


 100 Units/Ml Inj  20 Units


 SC LUNCH


    4/14/17 Reported 


 


 Novolog Flexpen


  (Insulin Aspart)


 100 Units/Ml Inj  20 Units


 SC QAM


    3/1/17 Reported  BREAKFAST.


 


 Lantus (Insulin


 Glargine) 100


 Unit/Ml Inj  57 Units


 SC QAM


    3/1/17 Reported 











Vital Signs


Weight (Kilograms):  125


Height (Feet):  6


Height (Inches):  1











  Date Time  Temp Pulse Resp B/P (MAP) Pulse Ox O2 Delivery O2 Flow Rate FiO2


 


8/2/17 08:49 36.5 70 20 111/74 (86) 98 Room Air  











Physical Exam


General Appearance:  no apparent distress


Respiratory/Chest:  


   Auscultation:  breath sounds normal


Cardiovascular:  


   Heart Auscultation:  RRR


Abdomen:  


   Inspection & Palpation:  soft





Assessment and Plan


EGD for surveillance of esophageal varices.  We have discussed the risks and 

benefits to include bleeding, infection, perforation pain and peritonitis

## 2017-08-02 NOTE — ANESTHESIOLOGY PROGRESS NOTE
Anesthesia Post Op Note


Date & Time


Aug 2, 2017 at 09:56





Vital Signs


Pain Intensity:  0





Vital Signs Past 12 Hours








  Date Time  Temp Pulse Resp B/P (MAP) Pulse Ox O2 Delivery O2 Flow Rate FiO2


 


8/2/17 09:47  76 20 123/73 (90) 98 Room Air  


 


8/2/17 09:34  76 20 114/74 (87) 97 Room Air  


 


8/2/17 09:20  73 20 111/68 (82) 97 Room Air  


 


8/2/17 08:49 36.5 70 20 111/74 (86) 98 Room Air  











Notes


Mental Status:  alert / awake / arousable, participated in evaluation


Pt Amnestic to Procedure:  Yes


Nausea / Vomiting:  adequately controlled


Pain:  adequately controlled


Airway Patency, RR, SpO2:  stable & adequate


BP & HR:  stable & adequate


Hydration State:  stable & adequate


Anesthetic Complications:  no major complications apparent

## 2017-08-02 NOTE — GI REPORT
Procedure Date: 8/2/2017 8:57 AM

Procedure:            Upper GI endoscopy

Indications:          Follow-up of esophageal varices

Medicines:            Monitored Anesthesia Care

Complications:        No immediate complications. Estimated blood loss: 

                      Minimal.

Estimated Blood Loss: Estimated blood loss was minimal.

Procedure:            Pre-Anesthesia Assessment:

                      - Prior to the procedure, a History and Physical was 

                      performed, and patient medications, allergies and 

                      sensitivities were reviewed. The patient's tolerance of 

                      previous anesthesia was reviewed.

                      - The risks and benefits of the procedure and the 

                      sedation options and risks were discussed with the 

                      patient. All questions were answered and informed 

                      consent was obtained.

                      - Patient identification and proposed procedure were 

                      verified prior to the procedure by the physician, the 

                      nurse and the anesthetist. The procedure was verified 

                      in the procedure room.

                      - Pre-procedure physical examination revealed no 

                      contraindications to sedation.

                      - ASA Grade Assessment: III - A patient with severe 

                      systemic disease.

                      - After reviewing the risks and benefits, the patient 

                      was deemed in satisfactory condition to undergo the 

                      procedure.

                      - The anesthesia plan was to use monitored anesthesia 

                      care (MAC).

                      - Immediately prior to administration of medications, 

                      the patient was re-assessed for adequacy to receive 

                      sedatives.

                      - The heart rate, respiratory rate, oxygen saturations, 

                      blood pressure, adequacy of pulmonary ventilation, and 

                      response to care were monitored throughout the 

                      procedure.

                      - The physical status of the patient was re-assessed 

                      after the procedure.

                      After obtaining informed consent, the endoscope was 

                      passed under direct vision. Throughout the procedure, 

                      the patient's blood pressure, pulse, and oxygen 

                      saturations were monitored continuously. The scope was 

                      introduced through the mouth, and advanced to the third 

                      part of duodenum. The upper GI endoscopy was 

                      accomplished without difficulty. The patient tolerated 

                      the procedure well.

Findings:

     Four columns of non-bleeding grade III varices were found in the middle 

     third of the esophagus and in the lower third of the esophagus, 30 to 40 

     cm from the incisors. They were 5 mm in largest diameter. No stigmata of 

     recent bleeding were evident and no red evelyn signs were present.

     Moderate portal hypertensive gastropathy was found in the entire 

     examined stomach. Biopsies were taken with a cold forceps for histology. 

     Estimated blood loss was minimal.

     The examined duodenum was normal.

Impression:           - Non-bleeding grade III esophageal varices.

                      - Portal hypertensive gastropathy. Biopsied.

                      - Normal examined duodenum.

Recommendation:       - Discharge patient to home (ambulatory).

                      - Advance diet as tolerated today.

                      - Repeat the upper endoscopy in 1 year for surveillance.

                      - Return to GI office as previously scheduled.

                      - Consider a beta blocker with dosage titrated by the 

                      heart rate.

Wiley Robb D.O.

Wiley Robb, DO

8/2/2017 9:16:19 AM

This report has been signed electronically.

Note Initiated On: 8/2/2017 8:57 AM

     I attest to the content of the Intraoperative Record and orders 

     documented therein, exceptions below

## 2017-08-02 NOTE — DISCHARGE INSTRUCTIONS
Endoscopy Patient Instructions


Date / Procedure(s) Performed


Aug 2, 2017.


EGD





Allergy Information


Coded Allergies:  


     Penicillins (Verified  Allergy, Severe, ANAPHYLAXIS, 7/31/17)


 reports an iv dose of penicillin in ER when he was 22 yrs


 old.  Had to stay in hospital for 2 days.


     Morphine (Verified  Allergy, Intermediate, Severe itching/mouth rash, 7/31/ 17)


 * PT MUST BE PRETREATED WITH BENADRYL


     Adhesives (Verified  Allergy, Unknown, rips skin, 7/31/17)


     Meperidine (Verified  Allergy, Unknown, pruritis/rash, 7/31/17)


 is able to tolerate IF PRETREATED WITH BENADRYL





Discharge Date / Findings


Aug 2, 2017.


Portal hypertensive gastropathy


Grade 3 esophageal varices





Medication Instructions


Stopped Medication(s):  


Patient was told not to take any insulin today.





Reported Home Medications








 Medications  Dose


 Route/Sig


 Max Daily Dose Days Date Category Dose


Instructions


 


 Duoneb


  (Ipratropium-Albuterol)


 3 Ml Nebu  1 Treatment


 INH Q4H PRN


    7/31/17 Reported 


 


 Ventolin Hfa


  (Albuterol) 200


 Puffs/28178 Mcg


 Aers  2-4 Puffs


 INH Q6H PRN


    7/31/17 Reported 


 


 Zantac


  (Ranitidine HCl)


 150 Mg Tab  150 Mg


 PO BID


    4/14/17 Reported 


 


 Prilosec


  (Omeprazole) 20


 Mg Capcr  20 Mg


 PO TID


    4/14/17 Reported 


 


 Novolog Flexpen


  (Insulin Aspart)


 100 Units/Ml Inj  27 Units


 SC QPM


    4/14/17 Reported 


 


 Novolog Flexpen


  (Insulin Aspart)


 100 Units/Ml Inj  20 Units


 SC LUNCH


    4/14/17 Reported 


 


 Novolog Flexpen


  (Insulin Aspart)


 100 Units/Ml Inj  20 Units


 SC QAM


    3/1/17 Reported  BREAKFAST.


 


 Lantus (Insulin


 Glargine) 100


 Unit/Ml Inj  57 Units


 SC QAM


    3/1/17 Reported 











Provider Instructions





Activity Restrictions





-  No exercising or heavy lifting for 24 hours. 


-  Do not drink alcohol the day of the procedure.


-  Do not drive a car or operate machinery until the day after the procedure.


-  Do not make any important decisions or sign important papers in 24 hours 

after the procedure.





Following Day:





-  Return to full activity which may include returning to work/school.





Diet





Start your diet with liquids and light foods (jello, soup, juice, toast).  Then 

eat your usual diet if not nauseated.





Treatment For Common After Affects





For mild abdominal pain, bloating, or excessive gas:





-  Rest


-  Eat lightly


-  Lie on right side





Follow-Up Information


Follow-up with Dr. Renee Herrera as scheduled


Dr. Epstein will determine if a nonselective beta blocker should be 

started for your esophageal varices





Anesthesia Information





What You Should Know





You have had a procedure that required some medicine to reduce anxiety and 

discomfort. This treatment is called moderate sedation.  


After receiving the treatment, you may be sleepy, but you will be able to 

breathe on your own.  The effects of the treatment may last for several hours.








Follow these instructions along with Activity/Diet recommendations noted above:





*  Do NOT do anything where dizziness or clumsiness would be dangerous.





*  Rest quietly at home today, then you can be up and about tomorrow.





*  Have a responsible person stay with you the rest of today.





*  You may have had an I.V. today.  If so, you may take the dressing off later 

today.





Recommendations


 


Call your doctor if:





*  Trouble breathing 





*  Continuous vomiting for more than 24 hours








*  Temperature above 101 degrees





*  Severe abdominal pain or bloating





*  Pain not relieved by pain medicine ordered





*  There is increased drainage or redness from any incision





*  A large amount of rectal bleeding greater than 2-3 tablespoons. 


   (If you had a polyp/s removed or have hemorrhoids, a small amount of blood -


    from the rectum is to be expected.)





*  You have any unanswered questions or concerns.








IN THE EVENT OF A SERIOUS EMERGENCY, GO TO THE NEAREST EMERGENCY ROOM








       Your discharge instructions were prepared by provider Wiley Robb.





 Patient Instructions Signature Page














[facility] 











Patient (or Guardian) Signature/Date:____________________________________ I 

have read and understand the instructions given to me by my caregivers.








Caregiver/RN/Doctor Signature/Date:____________________________________











The above-named patient and/or guardian has received patient instructions on 

this date.





























+  Original Patient Signature Page (only) stays with chart.  Please make copy 

for patient.

## 2017-09-26 ENCOUNTER — HOSPITAL ENCOUNTER (OUTPATIENT)
Dept: HOSPITAL 45 - C.GI | Age: 52
Discharge: HOME | End: 2017-09-26
Attending: INTERNAL MEDICINE
Payer: COMMERCIAL

## 2017-09-26 VITALS
BODY MASS INDEX: 37.32 KG/M2 | WEIGHT: 281.6 LBS | HEIGHT: 72.99 IN | WEIGHT: 281.6 LBS | BODY MASS INDEX: 37.32 KG/M2 | HEIGHT: 72.99 IN

## 2017-09-26 VITALS — HEART RATE: 75 BPM | SYSTOLIC BLOOD PRESSURE: 128 MMHG | OXYGEN SATURATION: 99 % | DIASTOLIC BLOOD PRESSURE: 69 MMHG

## 2017-09-26 DIAGNOSIS — F41.9: ICD-10-CM

## 2017-09-26 DIAGNOSIS — E11.9: ICD-10-CM

## 2017-09-26 DIAGNOSIS — Z98.1: ICD-10-CM

## 2017-09-26 DIAGNOSIS — M19.90: ICD-10-CM

## 2017-09-26 DIAGNOSIS — F17.200: ICD-10-CM

## 2017-09-26 DIAGNOSIS — K76.9: ICD-10-CM

## 2017-09-26 DIAGNOSIS — I85.00: Primary | ICD-10-CM

## 2017-09-26 DIAGNOSIS — K21.9: ICD-10-CM

## 2017-09-26 DIAGNOSIS — G47.33: ICD-10-CM

## 2017-09-26 DIAGNOSIS — Z79.4: ICD-10-CM

## 2017-09-26 DIAGNOSIS — Z87.81: ICD-10-CM

## 2017-09-26 DIAGNOSIS — Z90.49: ICD-10-CM

## 2017-09-26 DIAGNOSIS — J44.9: ICD-10-CM

## 2017-09-26 NOTE — ENDO HISTORY AND PHYSICAL
History & Physical


Date of Service:


Sep 26, 2017.


Chief Complaint:


Varices


Referring Physician:


Renee Herrera


History of Present Illness


Elective banding of varices





Past Medical History


Diabetes, Neurological Disorder, Arthritis, Fractures, Asthma, Gastrointestinal 

Disorder, Anxiety, Reflux, Sleep Apnea, COPD, Liver Disease, Other, Depression





Past Surgical History


Hx Cardiac Surgery:  Yes (HEART CATH, NO STENTS)


Hx Internal Defibrillator:  No


Hx Pacemaker:  No


Hx Abdominal Surgery:  Yes (HERNIA REPAIR, AVI)


Hx Post-Op Nausea and Vomiting:  No


Hx Cancer Surgery:  No


Hx Thoracic Surgery:  No


Hx Orthopedic:  Yes (THORACIC BACK SURGERY, CERVICAL FUSIONS X3 (LIMITED LT,RT,

UP))


Hx Urinary Tract Surgery:  No





Family History


None





Social History


Smoking Status:  Current Every Day Smoker


Hx Substance Use:  No


Hx Alcohol Use:  No





Allergies


Coded Allergies:  


     Penicillins (Verified  Allergy, Severe, ANAPHYLAXIS, 9/20/17)


 reports an iv dose of penicillin in ER when he was 22 yrs


 old.  Had to stay in hospital for 2 days.


     Morphine (Verified  Allergy, Intermediate, Severe itching/mouth rash, 9/20/ 17)


 * PT MUST BE PRETREATED WITH BENADRYL


     Adhesives (Verified  Allergy, Unknown, rips skin, 9/20/17)


     Meperidine (Verified  Allergy, Unknown, pruritis/rash, 9/20/17)


 is able to tolerate IF PRETREATED WITH BENADRYL





Current Medications





Reported Home Medications








 Medications  Dose


 Route/Sig


 Max Daily Dose Days Date Category Dose


Instructions


 


 Duoneb


  (Ipratropium-Albuterol)


 3 Ml Nebu  1 Treatment


 INH Q4H PRN


    7/31/17 Reported 


 


 Ventolin Hfa


  (Albuterol) 200


 Puffs/69576 Mcg


 Aers  2-4 Puffs


 INH Q6H PRN


    7/31/17 Reported 


 


 Zantac


  (Ranitidine HCl)


 150 Mg Tab  150 Mg


 PO BID


    4/14/17 Reported 


 


 Prilosec


  (Omeprazole) 20


 Mg Capcr  20 Mg


 PO TID


    4/14/17 Reported 


 


 Novolog Flexpen


  (Insulin Aspart)


 100 Units/Ml Inj  27 Units


 SC QPM


    4/14/17 Reported 


 


 Novolog Flexpen


  (Insulin Aspart)


 100 Units/Ml Inj  20 Units


 SC LUNCH


    4/14/17 Reported 


 


 Novolog Flexpen


  (Insulin Aspart)


 100 Units/Ml Inj  20 Units


 SC QAM


    3/1/17 Reported  BREAKFAST.


 


 Lantus (Insulin


 Glargine) 100


 Unit/Ml Inj  60 Units


 SC QAM


    3/1/17 Reported 











Vital Signs


Weight (Kilograms):  127.73


Height (Feet):  6


Height (Inches):  1











  Date Time  Temp Pulse Resp B/P (MAP) Pulse Ox O2 Delivery O2 Flow Rate FiO2


 


9/26/17 08:40 36.9 79 18 124/75 (91) 98 Room Air  











Physical Exam


General Appearance:  no apparent distress


Respiratory/Chest:  


   Auscultation:  breath sounds normal


Cardiovascular:  


   Heart Auscultation:  RRR


Abdomen:  


   Inspection & Palpation:  soft





Assessment and Plan


EGD for banding of elective varices

## 2017-09-26 NOTE — DISCHARGE INSTRUCTIONS
Endoscopy Patient Instructions


Date / Procedure(s) Performed


Sep 26, 2017.


EGD





Allergy Information


Coded Allergies:  


     Penicillins (Verified  Allergy, Severe, ANAPHYLAXIS, 9/20/17)


 reports an iv dose of penicillin in ER when he was 22 yrs


 old.  Had to stay in hospital for 2 days.


     Morphine (Verified  Allergy, Intermediate, Severe itching/mouth rash, 9/20/ 17)


 * PT MUST BE PRETREATED WITH BENADRYL


     Adhesives (Verified  Allergy, Unknown, rips skin, 9/20/17)


     Meperidine (Verified  Allergy, Unknown, pruritis/rash, 9/20/17)


 is able to tolerate IF PRETREATED WITH BENADRYL





Discharge Date / Findings


Sep 26, 2017.


esophageal varices, banded





Provider Instructions





Activity Restrictions





-  No exercising or heavy lifting for 24 hours. 


-  Do not drink alcohol the day of the procedure.


-  Do not drive a car or operate machinery until the day after the procedure.


-  Do not make any important decisions or sign important papers in 24 hours 

after the procedure.





Following Day:





-  Return to full activity which may include returning to work/school.





Diet





Start your diet with liquids and light foods (jello, soup, juice, toast).  

Remain on liquids only today.  Tomorrow, eat softer textured foods, and then 

advance your diet as tolerated.





Treatment For Common After Affects





For mild abdominal pain, bloating, or excessive gas:





-  Rest


-  Eat lightly


-  Lie on right side





Follow-Up Information


Follow-up with Renee Herrera as scheduled





Anesthesia Information





What You Should Know





You have had a procedure that required some medicine to reduce anxiety and 

discomfort. This treatment is called moderate sedation.  


After receiving the treatment, you may be sleepy, but you will be able to 

breathe on your own.  The effects of the treatment may last for several hours.








Follow these instructions along with Activity/Diet recommendations noted above:





*  Do NOT do anything where dizziness or clumsiness would be dangerous.





*  Rest quietly at home today, then you can be up and about tomorrow.





*  Have a responsible person stay with you the rest of today.





*  You may have had an I.V. today.  If so, you may take the dressing off later 

today.





Recommendations


 


Call your doctor if:





*  Trouble breathing 





*  Continuous vomiting for more than 24 hours








*  Temperature above 101 degrees





*  Severe abdominal pain or bloating





*  Pain not relieved by pain medicine ordered





*  There is increased drainage or redness from any incision





*  A large amount of rectal bleeding greater than 2-3 tablespoons. 


   (If you had a polyp/s removed or have hemorrhoids, a small amount of blood -


    from the rectum is to be expected.)





*  You have any unanswered questions or concerns.








IN THE EVENT OF A SERIOUS EMERGENCY, GO TO THE NEAREST EMERGENCY ROOM








       Your discharge instructions were prepared by provider Bart Casillas.





 Patient Instructions Signature Page














Blake Field 











Patient (or Guardian) Signature/Date:____________________________________ I 

have read and understand the instructions given to me by my caregivers.








Caregiver/RN/Doctor Signature/Date:____________________________________











The above-named patient and/or guardian has received patient instructions on 

this date.





























+  Original Patient Signature Page (only) stays with chart.  Please make copy 

for patient.

## 2017-09-26 NOTE — GI REPORT
Procedure Date: 9/26/2017 10:24 AM

Procedure:            Upper GI endoscopy

Indications:          For therapy of esophageal varices

Medicines:            See the Anesthesia note for documentation of the 

                      administered medications

Complications:        No immediate complications.

Estimated Blood Loss: Estimated blood loss: none.

Procedure:            Pre-Anesthesia Assessment:

                      - ASA Grade Assessment: III - A patient with severe 

                      systemic disease.

                      After obtaining informed consent, the endoscope was 

                      passed under direct vision. Throughout the procedure, 

                      the patient's blood pressure, pulse, and oxygen 

                      saturations were monitored continuously. The Scope was 

                      introduced through the mouth, and advanced to the 

                      second part of duodenum. The upper GI endoscopy was 

                      accomplished without difficulty. The patient tolerated 

                      the procedure well.

Findings:

     Four columns of grade II varices were found in the lower third of the 

     esophagus,. No stigmata of recent bleeding were evident and no red evelyn 

     signs were present. Three bands were successfully placed with incomplete 

     eradication of varices.

     The GE junction was at 40 cm, and was irregular.

     There was moderate portal HTN gastropathy throughout the entire stomach.

     There were multiple petechiae in the cardia and in the antrum, 

     consistent with GAVE.

     No gastric varices were seen.

     The duodenum was normal.

Impression:           - Non-bleeding grade II esophageal varices. 3 bands 

                      placed, but varices not completely eradicated.

Recommendation:       - Discharge patient to home. Liquids only today, then 

                      adv diet as tolerated. Repeat procedure in 3 weeks.

Bart Epstein M.D. 

Bart Epstein MD

9/26/2017 11:04:48 AM

This report has been signed electronically.

Note Initiated On: 9/26/2017 10:24 AM

     I attest to the content of the Intraoperative Record and orders 

     documented therein, exceptions below

## 2017-10-18 ENCOUNTER — HOSPITAL ENCOUNTER (OUTPATIENT)
Dept: HOSPITAL 45 - C.GI | Age: 52
Discharge: HOME | End: 2017-10-18
Attending: INTERNAL MEDICINE
Payer: COMMERCIAL

## 2017-10-18 VITALS
HEIGHT: 72.99 IN | BODY MASS INDEX: 37.19 KG/M2 | WEIGHT: 280.58 LBS | WEIGHT: 280.58 LBS | BODY MASS INDEX: 37.19 KG/M2 | HEIGHT: 72.99 IN

## 2017-10-18 VITALS — HEART RATE: 80 BPM | OXYGEN SATURATION: 96 % | SYSTOLIC BLOOD PRESSURE: 118 MMHG | DIASTOLIC BLOOD PRESSURE: 72 MMHG

## 2017-10-18 DIAGNOSIS — E11.9: ICD-10-CM

## 2017-10-18 DIAGNOSIS — F17.200: ICD-10-CM

## 2017-10-18 DIAGNOSIS — F41.9: ICD-10-CM

## 2017-10-18 DIAGNOSIS — F32.9: ICD-10-CM

## 2017-10-18 DIAGNOSIS — J44.9: ICD-10-CM

## 2017-10-18 DIAGNOSIS — K21.9: ICD-10-CM

## 2017-10-18 DIAGNOSIS — I85.10: ICD-10-CM

## 2017-10-18 DIAGNOSIS — K31.89: ICD-10-CM

## 2017-10-18 DIAGNOSIS — Z79.899: ICD-10-CM

## 2017-10-18 DIAGNOSIS — Z79.4: ICD-10-CM

## 2017-10-18 DIAGNOSIS — G47.30: ICD-10-CM

## 2017-10-18 DIAGNOSIS — K76.6: Primary | ICD-10-CM

## 2017-10-18 NOTE — ANESTHESIOLOGY PROGRESS NOTE
Anesthesia Post Op Note


Date & Time


Oct 18, 2017 at 10:10





Vital Signs


Pain Intensity:  0





Vital Signs Past 12 Hours








  Date Time  Temp Pulse Resp B/P (MAP) Pulse Ox O2 Delivery O2 Flow Rate FiO2


 


10/18/17 10:02  80 18 118/72 (87) 96 Room Air  


 


10/18/17 09:50  80 18 116/68 (84) 96 Room Air  


 


10/18/17 09:40  77 18 98/47 (64) 95 Room Air  


 


10/18/17 08:03 36.1 74 18 122/74 (90) 98 Room Air  











Notes


Mental Status:  alert / awake / arousable, participated in evaluation


Pt Amnestic to Procedure:  Yes


Nausea / Vomiting:  adequately controlled


Pain:  adequately controlled


Airway Patency, RR, SpO2:  stable & adequate


BP & HR:  stable & adequate


Hydration State:  stable & adequate


Anesthetic Complications:  no major complications apparent

## 2017-10-18 NOTE — GI REPORT
Procedure Date: 10/18/2017 9:21 AM

Procedure:            Upper GI endoscopy

Indications:          2nd degree variceal eradication without history bleeding

Medicines:            General Anesthesia

Complications:        No immediate complications. Estimated blood loss: None.

Estimated Blood Loss: Estimated blood loss: none.

Procedure:            Pre-Anesthesia Assessment:

                      - Pre-Anesthesia Assessment:

                      - Prior to the procedure, a History and Physical was 

                      performed, and patient medications, allergies and 

                      sensitivities were reviewed. The patient's tolerance of 

                      previous anesthesia was reviewed. Please see 

                      [email or web address] for complete details.

                      - The risks and benefits of the procedure and the 

                      sedation options and risks were discussed with the 

                      patient. All questions were answered and informed 

                      consent was obtained.

                      - Patient identification and proposed procedure were 

                      verified prior to the procedure by the physician and 

                      the nurse. The procedure was verified in the 

                      pre-procedure area in the procedure room.

                      After obtaining informed consent, the endoscope was 

                      passed carefully and meticuously under direct vision 

                      and only advanced when the lumen was clearly 

                      identified, C02 insuflation was utilized throughout the 

                      entirity of the procedure. Throughout the procedure, 

                      the patient's blood pressure, pulse, and oxygen 

                      saturations were monitored continuously.

                      After obtaining informed consent, the endoscope was 

                      passed under direct vision. Throughout the procedure, 

                      the patient's blood pressure, pulse, and oxygen 

                      saturations were monitored continuously. The Scope was 

                      introduced through the mouth, and advanced to the 

                      second part of duodenum. The upper GI endoscopy was 

                      accomplished without difficulty. The patient tolerated 

                      the procedure well.

Findings:

     Grade I varices were found in the lower third of the esophagus.

     Portal hypertensive gastropathy was found in the entire examined stomach.

     The examined duodenum was normal.

Impression:           - Grade I esophageal varices.

                      - Portal hypertensive gastropathy.

                      - Normal examined duodenum.

                      - No specimens collected.

Recommendation:       - Discharge patient to home (with escort).

                      - Initiate non-selective beta blocker for titrated goal 

                      pulse of 55-65 BPM

                      - Follow up with Dr. Lucian Angela MD

10/18/2017 9:45:19 AM

This report has been signed electronically.

Note Initiated On: 10/18/2017 9:21 AM

     I attest to the content of the Intraoperative Record and orders 

     documented therein, exceptions below

## 2017-10-18 NOTE — ENDO HISTORY AND PHYSICAL
History & Physical


Date of Service:


Oct 18, 2017.


Chief Complaint:


Varices banding


Referring Physician:


Dr. Renee Herrera


History of Present Illness


53 yo with clinical cirrhosis complicated by large non-bleeding varices, 

presenting for variceal evaluation/eradication





Past Medical History


Diabetes, Neurological Disorder, Arthritis, Fractures, Asthma, Gastrointestinal 

Disorder, Anxiety, Reflux, Sleep Apnea, COPD, Liver Disease, Other, Depression





Past Surgical History


Hx Cardiac Surgery:  Yes (HEART CATH, NO STENTS)


Hx Internal Defibrillator:  No


Hx Pacemaker:  No


Hx Abdominal Surgery:  Yes (HERNIA REPAIR, AVI)


Hx of Implantable Prosthesis:  No


Hx Post-Op Nausea and Vomiting:  No


Hx Cancer Surgery:  No


Hx Thoracic Surgery:  No


Hx Orthopedic:  Yes (THORACIC BACK SURGERY, CERVICAL FUSIONS X3 (LIMITED LT,RT,

UP))


Hx Urinary Tract Surgery:  No





Family History


None





Social History


Smoking Status:  Current Every Day Smoker


Hx Substance Use:  No


Hx Alcohol Use:  No





Allergies


Coded Allergies:  


     Penicillins (Verified  Allergy, Severe, ANAPHYLAXIS, 10/11/17)


 reports an iv dose of penicillin in ER when he was 22 yrs


 old.  Had to stay in hospital for 2 days.


     Morphine (Verified  Allergy, Intermediate, Severe itching/mouth rash, 10/11

/17)


 * PT MUST BE PRETREATED WITH BENADRYL


     Adhesives (Verified  Allergy, Unknown, rips skin, 10/11/17)


     Meperidine (Verified  Allergy, Unknown, pruritis/rash, 10/11/17)


 is able to tolerate IF PRETREATED WITH BENADRYL





Current Medications





Reported Home Medications








 Medications  Dose


 Route/Sig


 Max Daily Dose Days Date Category Dose


Instructions


 


 Cleocin


  (Clindamycin Hcl)


 300 Mg Cap  300 Mg


 PO BID


   10 10/11/17 Reported 


 


 Duoneb


  (Ipratropium-Albuterol)


 3 Ml Nebu  1 Treatment


 INH Q4H PRN


    7/31/17 Reported 


 


 Ventolin Hfa


  (Albuterol) 200


 Puffs/39248 Mcg


 Aers  2-4 Puffs


 INH Q6H PRN


    7/31/17 Reported 


 


 Zantac


  (Ranitidine HCl)


 150 Mg Tab  150 Mg


 PO BID


    4/14/17 Reported 


 


 Prilosec


  (Omeprazole) 20


 Mg Capcr  20 Mg


 PO TID


    4/14/17 Reported 


 


 Novolog Flexpen


  (Insulin Aspart)


 100 Units/Ml Inj  27 Units


 SC QPM


    4/14/17 Reported 


 


 Novolog Flexpen


  (Insulin Aspart)


 100 Units/Ml Inj  20 Units


 SC LUNCH


    4/14/17 Reported 


 


 Novolog Flexpen


  (Insulin Aspart)


 100 Units/Ml Inj  20 Units


 SC QAM


    3/1/17 Reported  BREAKFAST.


 


 Lantus (Insulin


 Glargine) 100


 Unit/Ml Inj  60 Units


 SC QAM


    3/1/17 Reported 











Vital Signs


Weight (Kilograms):  127.27


Height (Feet):  6


Height (Inches):  1











  Date Time  Temp Pulse Resp B/P (MAP) Pulse Ox O2 Delivery O2 Flow Rate FiO2


 


10/18/17 08:03 36.1 74 18 122/74 (90) 98 Room Air  











Physical Exam


General Appearance:  WD/WN, no apparent distress


Respiratory/Chest:  


   Respiratory effort:  no dyspnea


   Auscultation:  breath sounds normal, CTA except as noted, no wheezing


Cardiovascular:  


   Apical Impulse:  not displaced


   Heart Auscultation:  RRR, normal S1, normal S2


Abdomen:  


   Bowel Sounds:  normal


   Inspection & Palpation:  soft, non-distended





Assessment and Plan


53 yo presenting for variceal evaluation, possible eradication

## 2017-10-18 NOTE — DISCHARGE INSTRUCTIONS
Endoscopy Patient Instructions


Date / Procedure(s) Performed


Oct 18, 2017.


EGD





Allergy Information


Coded Allergies:  


     Penicillins (Verified  Allergy, Severe, ANAPHYLAXIS, 10/11/17)


 reports an iv dose of penicillin in ER when he was 22 yrs


 old.  Had to stay in hospital for 2 days.


     Morphine (Verified  Allergy, Intermediate, Severe itching/mouth rash, 10/11

/17)


 * PT MUST BE PRETREATED WITH BENADRYL


     Adhesives (Verified  Allergy, Unknown, rips skin, 10/11/17)


     Meperidine (Verified  Allergy, Unknown, pruritis/rash, 10/11/17)


 is able to tolerate IF PRETREATED WITH BENADRYL





Discharge Date / Findings


Oct 18, 2017.


Impression:           - Grade I esophageal varices.


                      - Portal hypertensive gastropathy.


                      - Normal examined duodenum.


                      - No specimens collected.


Recommendation:       - Discharge patient to home (with escort).


                      - Initiate non-selective beta blocker for titrated goal 


                      pulse of 55-65 BPM


                      - Follow up with Dr. Epstein





Medication Instructions


Stopped Medication(s):  


Patient was told to not take his am insulin. Patient's BSG was 107 at 0620 this 


am.





Provider Instructions





Activity Restrictions





-  No exercising or heavy lifting for 24 hours. 


-  Do not drink alcohol the day of the procedure.


-  Do not drive a car or operate machinery until the day after the procedure.


-  Do not make any important decisions or sign important papers in 24 hours 

after the procedure.





Following Day:





-  Return to full activity which may include returning to work/school.





Diet





Start your diet with liquids and light foods (jello, soup, juice, toast).  Then 

eat your usual diet if not nauseated.





Treatment For Common After Affects





For mild abdominal pain, bloating, or excessive gas:





-  Rest


-  Eat lightly


-  Lie on right side





Follow-Up Information


Follow-up with Dr. Renee Herrera as scheduled





Anesthesia Information





What You Should Know





You have had a procedure that required some medicine to reduce anxiety and 

discomfort. This treatment is called moderate sedation.  


After receiving the treatment, you may be sleepy, but you will be able to 

breathe on your own.  The effects of the treatment may last for several hours.








Follow these instructions along with Activity/Diet recommendations noted above:





*  Do NOT do anything where dizziness or clumsiness would be dangerous.





*  Rest quietly at home today, then you can be up and about tomorrow.





*  Have a responsible person stay with you the rest of today.





*  You may have had an I.V. today.  If so, you may take the dressing off later 

today.





Recommendations


 


Call your doctor if:





*  Trouble breathing 





*  Continuous vomiting for more than 24 hours








*  Temperature above 101 degrees





*  Severe abdominal pain or bloating





*  Pain not relieved by pain medicine ordered





*  There is increased drainage or redness from any incision





*  A large amount of rectal bleeding greater than 2-3 tablespoons. 


   (If you had a polyp/s removed or have hemorrhoids, a small amount of blood -


    from the rectum is to be expected.)





*  You have any unanswered questions or concerns.








IN THE EVENT OF A SERIOUS EMERGENCY, GO TO THE NEAREST EMERGENCY ROOM








       Your discharge instructions were prepared by provider Ramon Angela.





 Patient Instructions Signature Page














Blake Field 











Patient (or Guardian) Signature/Date:____________________________________ I 

have read and understand the instructions given to me by my caregivers.








Caregiver/RN/Doctor Signature/Date:____________________________________











The above-named patient and/or guardian has received patient instructions on 

this date.





























+  Original Patient Signature Page (only) stays with chart.  Please make copy 

for patient.

## 2018-04-30 ENCOUNTER — HOSPITAL ENCOUNTER (OUTPATIENT)
Dept: HOSPITAL 45 - C.RAD | Age: 53
Discharge: HOME | End: 2018-04-30
Attending: FAMILY MEDICINE
Payer: COMMERCIAL

## 2018-04-30 DIAGNOSIS — R13.12: Primary | ICD-10-CM

## 2018-04-30 NOTE — DIAGNOSTIC IMAGING REPORT
MODIFIED BARIUM SWALLOW



CLINICAL HISTORY: DYSPHAGIA    



COMPARISON STUDY:  Modified barium swallow March 14, 2016.



Fluoroscopy time: 1.6 minutes.



FINDINGS: Incidental note is made of postoperative findings within the cervical

spine. No tracheal aspiration is identified with thin liquids, nectar thick

liquids, pudding or crackers with paste. Swallowing mechanism was intact.



IMPRESSION:  



1. No tracheal aspiration. Intact swallowing mechanism.



2. Full recommendations by speech pathology to follow. 









Electronically signed by:  El Salazar M.D.

4/30/2018 12:16 PM



Dictated Date/Time:  4/30/2018 12:14 PM

## 2018-04-30 NOTE — SWALLOWING EVALUATION
REFERRING SPEECH PATHOLOGIST:  n/a



HISTORY:  This 52 year-old man was referred for a VFSS at [facility] in 
order to address c/o solid food dysphagia.  The patient has a PMH significant for GERD, DM 
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 
varices.  The patient added to his history by repoting having "liver disease" and, 
separately, he said he has been to an ENT and there were no findings.   He had a VFSS in 
March 2016 that identified the osteophyte that was revised.  No aspiration during that 
study.  Currently the patient's diet level is regular.  



PROCEDURE:  The patient was seen in the Radiology Department of [facility] for the VFSS.  Cursory examination of the oral cavity revealed adequate dentition.  
Movement of the articulators was WNL. 



The patient was seated on a stool and was viewed in both the Anterior-Posterior (A-P) and 
Lateral planes.  Volitional phonation exercises completed in the A-P plane revealed 
bilateral vocal fold movement and vocal intensity within functional limits.



In the lateral plane, the patient was given the following boluses:  1 tsp. thin liquid 
barium x 2, single swallow thin liquid barium self-presented from a cup, sequential 
swallows of thin liquid barium self-presented from a straw, 1 tsp. nectar-thick liquid 
barium, single swallow nectar-thick liquid barium self-presented from a cup, 1 tsp. barium 
pudding, and 1 club cracker with barium pudding.



The patient was then repositioned into the A-P plane and given 1 tsp. barium pudding.



RESULTS:  

Oral Stage:  No interlabial bolus loss.  Able to hold thin liquid bolus between tongue and 
palate.  Mastication and bolus transfer were timely and complete.  Collection of cracker 
bolus left at velum and tongue base after initial swallow.  Cleared with a second swallow. 
 Initiation of the pharyngeal swallow occurred when the bolus head was at the posterior 
angle of the ramus.  



The patient struggled with oral bolus clearance of the cracker, but no other oral-stage 
dysphagia.



Pharyngeal Stage:  No bolus between soft palate and pharyngeal wall.  Laryngeal elevation, 
anterior hyoid excursion, epiglottic inversion, and laryngeal vestibular closure were 
complete.  Pharyngeal stripping wave was diminished.  Pharyngeal contraction was complete. 
 Distention and duration of PES opening was complete. There was collection of contrast 
between tongue base and pharyngeal wall.   Hypopharyngeal retention of the cracker bolus 
was spread between tongue base and velum and the patient had to swallow twice with 
increased effort in order to clear the bolus.  There was no obvious cause for the bolus to 
stick in the pharynx.  



No penetration or aspiration during this study.  There was mild-moderate pharyngeal 
dysphagia with solid foods, but no apparent stricture, obstruction, or weakness to account 
for it.  



Esophageal Stage:  A pudding bolus transited the esophagus without impedance. 



SUMMARY/RECOMMENDATIONS:  This patient presents with mild-moderate oral-pharyngeal 
dysphagia.  The following is recommended:

  1. SLIPPERY diet:  choose foods that are moist, loose, slippery; avoid foods that are 
doughy, dry, thick, pasty; use condiments and healthy oils to make foods slippery

  2. Compensatory Strategies:  alternate solids and liquids during meals; avoid icy cold 
beverages at meals (use room temperature liquids or warmer); follow reflux precautions

  3. IF the complaints persist and/or worsen, the patient could try outpatient SLP 
treatment for dysphagia management.  Maybe tongue base strengthening could help him with 
bolus clearance.  



A summary of the results and recommendations was discussed with the patient immediately 
following the study.  He verbalized understanding.   I am unsure why the patient has such 
difficulty with transfer and swallow of solids.   



Thank you for referral of this patient.  Please contact me at [phone] if any 
additional information is needed.

## 2018-08-01 ENCOUNTER — HOSPITAL ENCOUNTER (OUTPATIENT)
Dept: HOSPITAL 45 - C.LAB1850 | Age: 53
Discharge: HOME | End: 2018-08-01
Attending: INTERNAL MEDICINE
Payer: COMMERCIAL

## 2018-08-01 DIAGNOSIS — E11.9: Primary | ICD-10-CM

## 2018-08-01 DIAGNOSIS — Z79.4: ICD-10-CM

## 2018-08-01 DIAGNOSIS — E55.9: ICD-10-CM

## 2018-08-01 LAB
CREAT UR-MCNC: 159 MG/DL
HBA1C MFR BLD: 5.9 % (ref 4.5–5.6)